# Patient Record
(demographics unavailable — no encounter records)

---

## 2025-02-05 NOTE — ASSESSMENT
[FreeTextEntry1] : Mr Grant is a 49 year year old male here for evaluation for anal fistula.   Discussed with patient treatment options for anal fistula.  We will proceed with exam under anesthesia with possible primary fistulotomy versus seton placement.  Discussed the potential need for seton placement if significant portion of sphincter is involved.  If relatively superficial or minimal sphincter involved we will proceed with primary fistulotomy.  If a seton is placed a secondary procedure was needed such as a rectal advancement flap or ligation of intersphincteric fistula tract.  Risks and benefits of surgery discussed including infection bleeding delayed wound healing and recurrence of fistula.  Patient is in agreement with proceeding with surgery.  Will schedule for exam anesthesia with treatment of fistula possible seton placement

## 2025-02-05 NOTE — HISTORY OF PRESENT ILLNESS
[FreeTextEntry1] : 49-year-old male here for initial evaluation for possible cyst versus fistula.  Notes some intermittent drainage and discomfort in the perianal area.  Does not have a specific episode of perianal abscess.  Although he did note some drainage in the past after some swelling and pain.  He has not had a persistent opening in the perianal area that occasionally swells and drains.  Small volume fluid.  No prior surgeries for anal fistulas.  No prior surgeries for hemorrhoids.  History of kidney stones.

## 2025-02-05 NOTE — PHYSICAL EXAM
[Abdomen Masses] : No abdominal masses [Abdomen Tenderness] : ~T No ~M abdominal tenderness [Fistula] : a fistula [Tender, Swollen] : nontender, non-swollen [Normal] : was normal [None] : there was no rectal abscess [JVD] : no jugular venous distention  [Respiratory Effort] : normal respiratory effort [No Rash or Lesion] : No rash or lesion [Alert] : alert [Calm] : calm [de-identified] : No acute distress

## 2025-04-30 NOTE — HISTORY OF PRESENT ILLNESS
[FreeTextEntry1] : Mr. Grant is a 49 year old male who is here for a postop visit after EUA, intersphincteric fistulotomy on 4/14/25.   Pt has no pain. Reports yellowish drainage at surgical site. Denies fever or chills. Has returned to work with no issues.

## 2025-04-30 NOTE — PHYSICAL EXAM
[Normal Breath Sounds] : Normal breath sounds [No Rash or Lesion] : No rash or lesion [Alert] : alert [JVD] : no jugular venous distention  [de-identified] : surgical wound without erythema or purulent drainage, yellow serous drainage consistent with normal wound healing  [de-identified] : no acute distress

## 2025-04-30 NOTE — PHYSICAL EXAM
[Normal Breath Sounds] : Normal breath sounds [No Rash or Lesion] : No rash or lesion [Alert] : alert [JVD] : no jugular venous distention  [de-identified] : surgical wound without erythema or purulent drainage, yellow serous drainage consistent with normal wound healing  [de-identified] : no acute distress

## 2025-04-30 NOTE — ASSESSMENT
[FreeTextEntry1] : Mr. Grant is a 49 year old male who is here for a postop visit after EUA, intersphincteric fistulotomy on 4/14/25.   Surgical wound healing well without any complications.   Pt may resume normal activity.  Pt to follow up in 4-6 weeks.

## 2025-04-30 NOTE — PHYSICAL EXAM
[Normal Breath Sounds] : Normal breath sounds [No Rash or Lesion] : No rash or lesion [Alert] : alert [JVD] : no jugular venous distention  [de-identified] : surgical wound without erythema or purulent drainage, yellow serous drainage consistent with normal wound healing  [de-identified] : no acute distress

## 2025-06-14 NOTE — HISTORY OF PRESENT ILLNESS
[FreeTextEntry1] : Mr. Grant is a 49 year old male who is here for a postop visit after EUA, intersphincteric fistulotomy on 4/14/25. Pt is feeling well. Pt still experiencing some discomfort. Pt reports he continues to have yellow drainage at the surgical site.  Denies fever, chills, or purulent discharge from surgical incisions.

## 2025-06-14 NOTE — ASSESSMENT
[FreeTextEntry1] : Mr. Grant is a 49 year old male who is here for a postop visit after EUA, intersphincteric fistulotomy on 4/14/25. Surgical wound healing well without any complications. Surgical site has not yet completely healed.  Pt to start topical nifedipine/lidocaine ointment to aid wound healing.  Pt to follow up in 8 weeks.

## 2025-06-14 NOTE — PHYSICAL EXAM
[Normal Breath Sounds] : Normal breath sounds [No Rash or Lesion] : No rash or lesion [Alert] : alert [Calm] : calm [Abdomen Masses] : No abdominal masses [Abdomen Tenderness] : ~T No ~M abdominal tenderness [JVD] : no jugular venous distention  [de-identified] : surgical wound without erythema or purulent drainage, yellow serous drainage consistent with normal wound healing   [de-identified] : no acute distress

## 2025-06-14 NOTE — PHYSICAL EXAM
[Normal Breath Sounds] : Normal breath sounds [No Rash or Lesion] : No rash or lesion [Alert] : alert [Calm] : calm [Abdomen Masses] : No abdominal masses [Abdomen Tenderness] : ~T No ~M abdominal tenderness [JVD] : no jugular venous distention  [de-identified] : surgical wound without erythema or purulent drainage, yellow serous drainage consistent with normal wound healing   [de-identified] : no acute distress

## 2025-06-14 NOTE — PHYSICAL EXAM
[Normal Breath Sounds] : Normal breath sounds [No Rash or Lesion] : No rash or lesion [Alert] : alert [Calm] : calm [Abdomen Masses] : No abdominal masses [Abdomen Tenderness] : ~T No ~M abdominal tenderness [JVD] : no jugular venous distention  [de-identified] : surgical wound without erythema or purulent drainage, yellow serous drainage consistent with normal wound healing   [de-identified] : no acute distress

## 2025-06-14 NOTE — PHYSICAL EXAM
[Normal Breath Sounds] : Normal breath sounds [No Rash or Lesion] : No rash or lesion [Alert] : alert [Calm] : calm [Abdomen Masses] : No abdominal masses [Abdomen Tenderness] : ~T No ~M abdominal tenderness [JVD] : no jugular venous distention  [de-identified] : surgical wound without erythema or purulent drainage, yellow serous drainage consistent with normal wound healing   [de-identified] : no acute distress

## 2025-07-30 NOTE — HISTORY OF PRESENT ILLNESS
[FreeTextEntry1] : Mr. Grant is a 49 year old male who is here for a postop visit after EUA, intersphincteric fistulotomy on 4/14/25. Pt was still c/o anal discomfort during visit last month. Compounded nifedipine/lidocaine ointment was prescribed.  Pt reports that he has a bleeding episode from surgical site approximately every 5 days. Pain has resolved.  Denies purulent drainage from site.

## 2025-07-30 NOTE — ASSESSMENT
[FreeTextEntry1] : Mr. Grant is a 49 year old male who is here for a postop visit after EUA, intersphincteric fistulotomy on 4/14/25. Pt is 3 months post op and is still experiencing bleeding episodes from surgical site. Undermining of the surgical site still apparent on physical exam today. This could be from a branch of a fistula tract or delayed wound healing. Will schedule EUA to further examine possible fistula tract in August.  Also discussed the option of having an MRI done. Pt will call office if he would want us to order imaging. Pt to stop compounded nifedipine/lidocaine ointment.

## 2025-07-30 NOTE — PHYSICAL EXAM
[Normal Breath Sounds] : Normal breath sounds [No Rash or Lesion] : No rash or lesion [Alert] : alert [Calm] : calm [Abdomen Masses] : No abdominal masses [Abdomen Tenderness] : ~T No ~M abdominal tenderness [JVD] : no jugular venous distention  [de-identified] : undermining at surgical incision, evident with lacrimal probe  [de-identified] : no acute distress